# Patient Record
Sex: FEMALE | Race: WHITE | HISPANIC OR LATINO | Employment: OTHER | URBAN - METROPOLITAN AREA
[De-identification: names, ages, dates, MRNs, and addresses within clinical notes are randomized per-mention and may not be internally consistent; named-entity substitution may affect disease eponyms.]

---

## 2024-09-24 ENCOUNTER — TELEPHONE (OUTPATIENT)
Dept: OBSTETRICS AND GYNECOLOGY | Facility: CLINIC | Age: 44
End: 2024-09-24
Payer: COMMERCIAL

## 2024-09-26 ENCOUNTER — TELEPHONE (OUTPATIENT)
Dept: OBSTETRICS AND GYNECOLOGY | Facility: CLINIC | Age: 44
End: 2024-09-26

## 2024-09-26 ENCOUNTER — OFFICE VISIT (OUTPATIENT)
Dept: OBSTETRICS AND GYNECOLOGY | Facility: CLINIC | Age: 44
End: 2024-09-26
Payer: COMMERCIAL

## 2024-09-26 VITALS
HEIGHT: 64 IN | SYSTOLIC BLOOD PRESSURE: 125 MMHG | WEIGHT: 171 LBS | BODY MASS INDEX: 29.19 KG/M2 | DIASTOLIC BLOOD PRESSURE: 79 MMHG

## 2024-09-26 DIAGNOSIS — N64.3 GALACTORRHEA: ICD-10-CM

## 2024-09-26 DIAGNOSIS — Z76.89 ENCOUNTER TO ESTABLISH CARE: ICD-10-CM

## 2024-09-26 DIAGNOSIS — N64.4 MASTALGIA: Primary | ICD-10-CM

## 2024-09-26 RX ORDER — ROSUVASTATIN CALCIUM 20 MG/1
20 TABLET, COATED ORAL DAILY
Qty: 30 TABLET | Refills: 11 | Status: SHIPPED | OUTPATIENT
Start: 2024-09-26

## 2024-09-26 RX ORDER — LEVOTHYROXINE SODIUM 100 UG/1
100 TABLET ORAL DAILY
Qty: 30 TABLET | Refills: 11 | Status: SHIPPED | OUTPATIENT
Start: 2024-09-26 | End: 2025-09-26

## 2024-09-26 RX ORDER — ROSUVASTATIN CALCIUM 20 MG/1
20 TABLET, COATED ORAL DAILY
COMMUNITY

## 2024-09-26 RX ORDER — LEVOTHYROXINE SODIUM 100 UG/1
100 TABLET ORAL DAILY
COMMUNITY

## 2024-09-26 NOTE — PROGRESS NOTES
"GYN VISIT    Chief Complaint   Patient presents with    ngyn     Here today for breast pain and right breast discharge        SUBJECTIVE    Luisa is a 44 y.o.  who presents with pain and discharge from her right breast. This started years ago. She denies any issues with this breast previously. She does report cysts being followed by imaging in [place]. She denies excess caffeine usage, trauma to her right breast, previous surgeries, or smoking cigarettes.     LMP: Patient's last menstrual period was 08/10/2024.    Past Medical History:   Diagnosis Date    Disease of thyroid gland         History reviewed. No pertinent surgical history.     Review of Systems   Constitutional:  Negative for chills, diaphoresis, fatigue and fever.   Respiratory:  Negative for cough, chest tightness and shortness of breath.    Cardiovascular:  Negative for chest pain and leg swelling.   Gastrointestinal:  Negative for abdominal pain, diarrhea, nausea and vomiting.   Genitourinary:  Positive for breast discharge and breast pain. Negative for amenorrhea and breast lump.   Musculoskeletal:  Negative for neck pain and neck stiffness.   Hematological:  Negative for adenopathy.   All other systems reviewed and are negative.      OBJECTIVE     Vitals:    24 1359   BP: 125/79   Weight: 77.6 kg (171 lb)   Height: 162.6 cm (64\")        Physical Exam  Constitutional:       General: She is awake.      Appearance: Normal appearance. She is well-developed and well-groomed.   Genitourinary:   Breasts:     Breasts are symmetrical.      Breasts are soft.     Right: Nipple discharge present. No inverted nipple.      Left: No inverted nipple.   HENT:      Head: Normocephalic and atraumatic.   Pulmonary:      Effort: Pulmonary effort is normal.   Musculoskeletal:      Cervical back: Normal range of motion.   Lymphadenopathy:      Upper Body:      Right upper body: No supraclavicular or axillary adenopathy.      Left upper body: No " supraclavicular or axillary adenopathy.   Neurological:      General: No focal deficit present.      Mental Status: She is alert and oriented to person, place, and time.   Skin:     General: Skin is warm and dry.   Psychiatric:         Mood and Affect: Mood normal.         Behavior: Behavior normal. Behavior is cooperative.   Vitals reviewed.         ASSESSMENT/PLAN    Diagnoses and all orders for this visit:    1. Mastalgia (Primary)  -     Mammo Diagnostic Digital Tomosynthesis Bilateral With CAD; Future  -     POC Pregnancy, Urine  -     US Breast Right Limited; Future  -     Culture, Routine - Swab, Breast, Right  -     Prolactin  -     US Breast Left Limited; Future  -     ; Future  -       -     US Breast Left Limited  -     US Breast Right Limited  -         2. Galactorrhea  -     Mammo Diagnostic Digital Tomosynthesis Bilateral With CAD; Future  -     POC Pregnancy, Urine  -     US Breast Right Limited; Future  -     Culture, Routine - Swab, Breast, Right  -     Prolactin  -     US Breast Left Limited; Future  -     ; Future  -       -     US Breast Left Limited  -     US Breast Right Limited  -         3. Encounter to establish care  -     levothyroxine (Synthroid) 100 MCG tablet; Take 1 tablet by mouth Daily.  Dispense: 30 tablet; Refill: 11  -     rosuvastatin (Crestor) 20 MG tablet; Take 1 tablet by mouth Daily.  Dispense: 30 tablet; Refill: 11    Breast imaging ordered. Will notify patient of results and referrals made if indicated.   Requested that patient have medical records sent from [place].   Return to care for annual exam when due.    Return for for breast imaging.    I spent 30 minutes caring for Luisa on this date of service. This time includes time spent by me in the following activities: preparing for the visit, reviewing tests, performing a medically appropriate examination and/or evaluation, counseling and educating the patient/family/caregiver,  referring and communicating with other health care professionals, documenting information in the medical record, independently interpreting results and communicating that information with the patient/family/caregiver, care coordination, ordering medications, ordering test(s), ordering procedure(s), obtaining a separately obtained history, and reviewing a separately obtained history.    Va Miguel CNM  10/1/2024  12:35 EDT

## 2024-09-27 DIAGNOSIS — Z76.89 ENCOUNTER TO ESTABLISH CARE: ICD-10-CM

## 2024-09-27 LAB
CANCER AG125 SERPL-ACNC: <2 U/ML (ref 0–38.1)
PROLACTIN SERPL-MCNC: 4.7 NG/ML (ref 4.8–33.4)

## 2024-09-27 RX ORDER — LEVOTHYROXINE SODIUM 100 UG/1
100 TABLET ORAL DAILY
Qty: 30 TABLET | Refills: 11 | OUTPATIENT
Start: 2024-09-27 | End: 2025-09-27

## 2024-09-27 RX ORDER — ROSUVASTATIN CALCIUM 20 MG/1
20 TABLET, COATED ORAL DAILY
Qty: 30 TABLET | Refills: 11 | OUTPATIENT
Start: 2024-09-27

## 2024-09-30 ENCOUNTER — PATIENT MESSAGE (OUTPATIENT)
Dept: OBSTETRICS AND GYNECOLOGY | Facility: CLINIC | Age: 44
End: 2024-09-30
Payer: COMMERCIAL

## 2024-09-30 ENCOUNTER — PATIENT ROUNDING (BHMG ONLY) (OUTPATIENT)
Dept: OBSTETRICS AND GYNECOLOGY | Facility: CLINIC | Age: 44
End: 2024-09-30
Payer: COMMERCIAL

## 2024-09-30 NOTE — PROGRESS NOTES
My chart message has been sent to the patient for PATIENT ROUNDING with [facility].

## 2024-10-01 LAB
BACTERIA SPEC CULT: NORMAL
MICROORGANISM/AGENT SPEC: NORMAL

## 2024-10-02 ENCOUNTER — APPOINTMENT (OUTPATIENT)
Dept: WOMENS IMAGING | Facility: HOSPITAL | Age: 44
End: 2024-10-02
Payer: COMMERCIAL

## 2024-10-02 PROCEDURE — 77066 DX MAMMO INCL CAD BI: CPT | Performed by: RADIOLOGY

## 2024-10-02 PROCEDURE — 76642 ULTRASOUND BREAST LIMITED: CPT | Performed by: RADIOLOGY

## 2024-10-02 PROCEDURE — 77062 BREAST TOMOSYNTHESIS BI: CPT | Performed by: RADIOLOGY

## 2024-10-02 PROCEDURE — G0279 TOMOSYNTHESIS, MAMMO: HCPCS | Performed by: RADIOLOGY

## 2024-10-09 DIAGNOSIS — N64.4 MASTALGIA: ICD-10-CM

## 2024-10-09 DIAGNOSIS — N64.3 GALACTORRHEA: ICD-10-CM

## 2025-01-31 ENCOUNTER — OFFICE VISIT (OUTPATIENT)
Dept: FAMILY MEDICINE CLINIC | Facility: CLINIC | Age: 45
End: 2025-01-31
Payer: COMMERCIAL

## 2025-01-31 VITALS
SYSTOLIC BLOOD PRESSURE: 118 MMHG | HEIGHT: 64 IN | RESPIRATION RATE: 16 BRPM | OXYGEN SATURATION: 99 % | WEIGHT: 176 LBS | DIASTOLIC BLOOD PRESSURE: 80 MMHG | BODY MASS INDEX: 30.05 KG/M2 | HEART RATE: 78 BPM | TEMPERATURE: 97.4 F

## 2025-01-31 DIAGNOSIS — L74.8 APOCRINE CYST: ICD-10-CM

## 2025-01-31 DIAGNOSIS — F41.9 ANXIETY: ICD-10-CM

## 2025-01-31 DIAGNOSIS — N64.52 NIPPLE DISCHARGE: ICD-10-CM

## 2025-01-31 DIAGNOSIS — E03.9 HYPOTHYROIDISM, UNSPECIFIED TYPE: ICD-10-CM

## 2025-01-31 DIAGNOSIS — R92.8 ABNORMAL MRI, BREAST: Primary | ICD-10-CM

## 2025-01-31 DIAGNOSIS — E78.2 MIXED HYPERLIPIDEMIA: ICD-10-CM

## 2025-01-31 DIAGNOSIS — Z12.12 SCREENING FOR COLORECTAL CANCER: ICD-10-CM

## 2025-01-31 DIAGNOSIS — Z12.11 SCREENING FOR COLORECTAL CANCER: ICD-10-CM

## 2025-01-31 PROCEDURE — 99204 OFFICE O/P NEW MOD 45 MIN: CPT | Performed by: NURSE PRACTITIONER

## 2025-01-31 RX ORDER — ESCITALOPRAM OXALATE 5 MG/5ML
20 SOLUTION ORAL DAILY
COMMUNITY
End: 2025-01-31

## 2025-01-31 RX ORDER — ESCITALOPRAM OXALATE 5 MG/5ML
20 SOLUTION ORAL DAILY
Qty: 1800 ML | Refills: 3 | Status: SHIPPED | OUTPATIENT
Start: 2025-01-31

## 2025-01-31 NOTE — PROGRESS NOTES
Subjective   Luisa Asencio is a 44 y.o. female.     History of Present Illness   Luisa Asencio 44 y.o. female who presents today for a new patient appointment.    she has a history of There is no problem list on file for this patient.  .  she is here to establish care I reviewed the PFSH recorded today by my MA/LPN staff.      Patient speaks [place]enegrin so  was used.        Patient has history of hypothyroidism for which she is on levothyroxine 100 mcg daily.  She has hyperlipidemia and is taking rosuvastatin 20 mg daily.  She had both of these refilled last year by gynecology.  She is also on E citalopram 20 mg daily but is getting a liquid version.  She has not yet had this prescribed here.  This was prescribed by a physician in [place].  She does need to have this refilled today and prefers to take the liquid if she has trouble swallowing pills.    She states her last Pap was sometime in 2022.  She saw the gynecologist in September regarding a breast abnormality but Pap was not done.  She would like to have Pap scheduled here instead of gynecology.  She was having left nipple discharge and gynecology ordered diagnostic bilateral mammogram as well as ultrasound of both breasts but no abnormality was seen.  Patient then had ultrasound by physician in [place] and subsequent breast MRI which showed a cyst.  She had a biopsy done which confirmed an apocrine cyst.  She was counseled by the surgeon in [place] that she needed to have it removed as there was a possibility of it becoming cancerous.  She does have a copy of the breast MRI and cytology report from the biopsy which she printed in English in will be scanned in the chart.  Discussed with her that she will need a referral to a breast surgeon but will need to have the breast MRI repeated so that the surgeon can review the images.  Patient is okay with this.  She did have some difficulty with the breast MRI and would like to  have the medication prescribed to help her anxiety during the MRI.    She states she had a colonoscopy done in the past related to some hemorrhoids she was having issue with.  This was about 5 years ago.  She denies any family history of colon cancer.  She is agreeable to colonoscopy for colon cancer screening.      Will also need to update labs on patient as she has not had any done since last year.  She will schedule an appointment 1 day when she is fasting.          The following portions of the patient's history were reviewed and updated as appropriate: allergies, current medications, past family history, past medical history, past social history, past surgical history, and problem list.    Review of Systems   Constitutional:  Negative for unexpected weight change.   Respiratory:  Negative for shortness of breath.    Cardiovascular:  Negative for chest pain and palpitations.   Psychiatric/Behavioral:  Negative for behavioral problems.        Objective   Physical Exam  Vitals and nursing note reviewed.   Constitutional:       Appearance: Normal appearance. She is well-developed.   Neck:      Thyroid: No thyromegaly.      Vascular: No carotid bruit.   Cardiovascular:      Rate and Rhythm: Normal rate and regular rhythm.   Pulmonary:      Effort: Pulmonary effort is normal.      Breath sounds: Normal breath sounds.   Neurological:      Mental Status: She is alert and oriented to person, place, and time.   Psychiatric:         Mood and Affect: Mood normal.         Behavior: Behavior normal.         Thought Content: Thought content normal.         Judgment: Judgment normal.         Assessment & Plan   Diagnoses and all orders for this visit:    1. Abnormal MRI, breast (Primary)  -     MRI Breast Left With & Without Contrast  -     Ambulatory Referral to Breast Surgery    2. Nipple discharge  -     MRI Breast Left With & Without Contrast  -     Ambulatory Referral to Breast Surgery    3. Apocrine cyst  -      Ambulatory Referral to Breast Surgery    4. Screening for colorectal cancer  -     Ambulatory Referral For Screening Colonoscopy    5. Anxiety  -     escitalopram (LEXAPRO) 1 mg/mL solution; Take 20 mL by mouth Daily.  Dispense: 1800 mL; Refill: 3  -     Comprehensive metabolic panel  -     Lipid panel  -     CBC and Differential  -     TSH  -     T4, free  -     Hemoglobin A1c  -     T3, free  -     Vitamin D 25 hydroxy    6. Hypothyroidism, unspecified type  -     Comprehensive metabolic panel  -     Lipid panel  -     CBC and Differential  -     TSH  -     T4, free  -     Hemoglobin A1c  -     T3, free  -     Vitamin D 25 hydroxy    7. Mixed hyperlipidemia  -     Comprehensive metabolic panel  -     Lipid panel  -     CBC and Differential  -     TSH  -     T4, free  -     Hemoglobin A1c  -     T3, free  -     Vitamin D 25 hydroxy             Patient will schedule Pap with me.  She will also schedule lab appointment.  Breast MRI ordered as well as referral to breast surgeon.  Colonoscopy ordered.  Will refill the escitalopram and she will let me know when she runs out of refills on the other 2 medications unless there are any adjustments that need to be made based on labs.   will need to be used for referral scheduling.

## 2025-02-04 LAB
25(OH)D3+25(OH)D2 SERPL-MCNC: 25.9 NG/ML (ref 30–100)
ALBUMIN SERPL-MCNC: 4.3 G/DL (ref 3.5–5.2)
ALBUMIN/GLOB SERPL: 1.8 G/DL
ALP SERPL-CCNC: 93 U/L (ref 39–117)
ALT SERPL-CCNC: 23 U/L (ref 1–33)
AST SERPL-CCNC: 18 U/L (ref 1–32)
BASOPHILS # BLD AUTO: 0.04 10*3/MM3 (ref 0–0.2)
BASOPHILS NFR BLD AUTO: 0.6 % (ref 0–1.5)
BILIRUB SERPL-MCNC: <0.2 MG/DL (ref 0–1.2)
BUN SERPL-MCNC: 10 MG/DL (ref 6–20)
BUN/CREAT SERPL: 14.7 (ref 7–25)
CALCIUM SERPL-MCNC: 9 MG/DL (ref 8.6–10.5)
CHLORIDE SERPL-SCNC: 102 MMOL/L (ref 98–107)
CHOLEST SERPL-MCNC: 149 MG/DL (ref 0–200)
CO2 SERPL-SCNC: 25.9 MMOL/L (ref 22–29)
CREAT SERPL-MCNC: 0.68 MG/DL (ref 0.57–1)
EGFRCR SERPLBLD CKD-EPI 2021: 109.6 ML/MIN/1.73
EOSINOPHIL # BLD AUTO: 0.11 10*3/MM3 (ref 0–0.4)
EOSINOPHIL NFR BLD AUTO: 1.5 % (ref 0.3–6.2)
ERYTHROCYTE [DISTWIDTH] IN BLOOD BY AUTOMATED COUNT: 13 % (ref 12.3–15.4)
GLOBULIN SER CALC-MCNC: 2.4 GM/DL
GLUCOSE SERPL-MCNC: 101 MG/DL (ref 65–99)
HBA1C MFR BLD: 5.5 % (ref 4.8–5.6)
HCT VFR BLD AUTO: 42.7 % (ref 34–46.6)
HDLC SERPL-MCNC: 52 MG/DL (ref 40–60)
HGB BLD-MCNC: 14 G/DL (ref 12–15.9)
IMM GRANULOCYTES # BLD AUTO: 0.03 10*3/MM3 (ref 0–0.05)
IMM GRANULOCYTES NFR BLD AUTO: 0.4 % (ref 0–0.5)
LDLC SERPL CALC-MCNC: 83 MG/DL (ref 0–100)
LYMPHOCYTES # BLD AUTO: 2.17 10*3/MM3 (ref 0.7–3.1)
LYMPHOCYTES NFR BLD AUTO: 30.3 % (ref 19.6–45.3)
MCH RBC QN AUTO: 29.6 PG (ref 26.6–33)
MCHC RBC AUTO-ENTMCNC: 32.8 G/DL (ref 31.5–35.7)
MCV RBC AUTO: 90.3 FL (ref 79–97)
MONOCYTES # BLD AUTO: 0.45 10*3/MM3 (ref 0.1–0.9)
MONOCYTES NFR BLD AUTO: 6.3 % (ref 5–12)
NEUTROPHILS # BLD AUTO: 4.36 10*3/MM3 (ref 1.7–7)
NEUTROPHILS NFR BLD AUTO: 60.9 % (ref 42.7–76)
NRBC BLD AUTO-RTO: 0 /100 WBC (ref 0–0.2)
PLATELET # BLD AUTO: 239 10*3/MM3 (ref 140–450)
POTASSIUM SERPL-SCNC: 4.3 MMOL/L (ref 3.5–5.2)
PROT SERPL-MCNC: 6.7 G/DL (ref 6–8.5)
RBC # BLD AUTO: 4.73 10*6/MM3 (ref 3.77–5.28)
SODIUM SERPL-SCNC: 139 MMOL/L (ref 136–145)
T3FREE SERPL-MCNC: 2.5 PG/ML (ref 2–4.4)
T4 FREE SERPL-MCNC: 1.37 NG/DL (ref 0.92–1.68)
TRIGL SERPL-MCNC: 72 MG/DL (ref 0–150)
TSH SERPL DL<=0.005 MIU/L-ACNC: 2.56 UIU/ML (ref 0.27–4.2)
VLDLC SERPL CALC-MCNC: 14 MG/DL (ref 5–40)
WBC # BLD AUTO: 7.16 10*3/MM3 (ref 3.4–10.8)

## 2025-02-07 NOTE — PROGRESS NOTES
GENERAL SURGERY BENIGN BREAST HISTORY AND PHYSICAL     SUMMARY:  Luisa Asencio is a 44 y.o. lady with:  A new diagnosis of right nipple discharge.  -Imaging and cyst aspiration done in [place] in the fall.  Have recommended proceeding with an MRI now to further evaluate.  She is agreeable.  Will schedule.    Referring Provider: Lucille Gibson ([place]), *    Chief complaint: nipple discharge    HPI: Ms. Luisa Asencio is seen at the request of Lucille Gibson ([place]), *. The patient is a 44 y.o. woman being seen for a new diagnosis of right nipple discharge.      She originally was having right nipple discharge. This has been ongoing for 8 months. She noticed it the first time when her pajamas were wet when she woke up. She now only notices it with stimulation.     She had an MRI, US and biopsy in [place].     Her work-up is detailed in the breast history section below. She has had regular annual mammograms. She has had a prior biopsy in 2020, which returned as benign. She denies any breast lumps, pain, skin changes.    She denies any family history of breast or ovarian cancer.     TIMELINE OF WORKUP:  9/11/2024 Bilateral Breast MRI:   Breasts with heterogeneous fibroglandular tissue.  Focal nodular enhancement of the right retroareolar region.    10/2/2024 Bilateral Diagnostic Mammo + US:   The breasts are heterogeneously dense.   Finding 1: There are round and punctate calcifications with diffuse distribution seen in both breasts. No suspicious forms are seen at this time.  No suspicious masses, calcifications, or architectural distortion is identified.  The symptomatic region is clearly marked and there is no suspicious finding in the region of clinical concern.  There is no sonographic correlate.  Benign negative.  Finding 2: There is no suspicious finding in the region of clinical concern.  On ultrasound there is a few elongated areas of duct ectasia seen in the subareolar region of the right  breast.  No discrete or suspicious abnormality is seen.  Benign negative.  BI-RADS Category 2    11/7/2024 right breast US guided FNA     11/7/2024 Cytology results:   Cytomorphological findings of apocrine cyst of the breast, benign     MEDICAL HISTORY:   Past Medical History:   Hypothyroidism   HLD     Past Surgical History:    Colonoscopy, 2021   Thyroidectomy     Family History:    As above    Social History:   Denies tobacco use  Denies alcohol use    Allergies:   No Known Allergies    Medications:     Current Outpatient Medications:     escitalopram (LEXAPRO) 1 mg/mL solution, Take 20 mL by mouth Daily., Disp: 1800 mL, Rfl: 3    levothyroxine (Synthroid) 100 MCG tablet, Take 1 tablet by mouth Daily., Disp: 30 tablet, Rfl: 11    rosuvastatin (Crestor) 20 MG tablet, Take 1 tablet by mouth Daily., Disp: 30 tablet, Rfl: 11    Labs:    Labs from 2/3/2025 personally reviewed by me     ROS:   Influenza-like illness: no fever, no  cough, no  sore throat, no  body aches, no loss of sense of taste or smell, no known exposure to person with Covid-19.  Constitutional: Negative for fevers or chills  HENT: Negative for hearing loss or runny nose  Eyes: Negative for vision changes or scleral icterus  Respiratory: Negative for cough or shortness of breath  Cardiovascular: Negative for chest pain or heart palpitations  Gastrointestinal: Negative for abdominal pain, nausea, vomiting, constipation, melena, or hematochezia  Genitourinary: Negative for hematuria or dysuria  Musculoskeletal: Negative for joint swelling or gait instability  Neurologic: Negative for tremors or seizures  Psychiatric: Negative for suicidal ideations or depression  All other systems reviewed and negative    PHYSICAL EXAM:   Constitutional: Well-developed well-nourished, no acute distress  Eyes: Conjunctiva normal, sclera nonicteric  ENMT: Hearing grossly normal, oral mucosa moist  Neck: Supple, no palpable mass, trachea midline  Respiratory: Clear to  auscultation, normal inspiratory effort  Cardiovascular: Regular rate, no murmur, no peripheral edema, no jugular venous distention  Breast: symmetric  Right: No visible abnormalities on inspection while seated, with arms raised or hands on hips. No masses, skin changes, or nipple abnormalities. Clear/yellow right nipple discharge.   Left:  No visible abnormalities on inspection while seated, with arms raised or hands on hips. No masses, skin changes, or nipple abnormalities.  No clinical chest wall involvement.  Gastrointestinal: Soft, nontender  Lymphatics (palpable nodes): No cervical, supraclavicular or axillary lymphadenopathy  Skin:  Warm, dry, no rash on visualized skin surfaces  Musculoskeletal: Symmetric strength, normal gait  Psychiatric: Alert and oriented ×3, normal affect       TL TAVERA M.D.  General and Endoscopic Surgery  [facility]    [address] [place] [address], [address]  [place], [place], [address]  P: [phone]  F: [phone]

## 2025-02-12 ENCOUNTER — OFFICE VISIT (OUTPATIENT)
Dept: SURGERY | Facility: CLINIC | Age: 45
End: 2025-02-12
Payer: COMMERCIAL

## 2025-02-12 VITALS
BODY MASS INDEX: 30.39 KG/M2 | DIASTOLIC BLOOD PRESSURE: 75 MMHG | HEIGHT: 64 IN | SYSTOLIC BLOOD PRESSURE: 120 MMHG | HEART RATE: 73 BPM | OXYGEN SATURATION: 97 % | WEIGHT: 178 LBS

## 2025-02-12 DIAGNOSIS — N64.52 NIPPLE DISCHARGE: Primary | ICD-10-CM

## 2025-02-12 PROCEDURE — 99204 OFFICE O/P NEW MOD 45 MIN: CPT | Performed by: STUDENT IN AN ORGANIZED HEALTH CARE EDUCATION/TRAINING PROGRAM

## 2025-02-17 DIAGNOSIS — F40.240 CLAUSTROPHOBIA: Primary | ICD-10-CM

## 2025-02-17 RX ORDER — ALPRAZOLAM 0.5 MG
TABLET ORAL
Qty: 1 TABLET | Refills: 0 | Status: SHIPPED | OUTPATIENT
Start: 2025-02-17

## 2025-03-10 ENCOUNTER — OFFICE VISIT (OUTPATIENT)
Dept: FAMILY MEDICINE CLINIC | Facility: CLINIC | Age: 45
End: 2025-03-10
Payer: COMMERCIAL

## 2025-03-10 VITALS
BODY MASS INDEX: 29.67 KG/M2 | TEMPERATURE: 97.8 F | HEART RATE: 68 BPM | RESPIRATION RATE: 14 BRPM | SYSTOLIC BLOOD PRESSURE: 112 MMHG | WEIGHT: 173.8 LBS | HEIGHT: 64 IN | DIASTOLIC BLOOD PRESSURE: 70 MMHG

## 2025-03-10 DIAGNOSIS — Z12.4 ENCOUNTER FOR PAPANICOLAOU SMEAR OF CERVIX: Primary | ICD-10-CM

## 2025-03-10 PROCEDURE — 99396 PREV VISIT EST AGE 40-64: CPT

## 2025-03-10 NOTE — PROGRESS NOTES
"Chief Complaint  Gynecologic Exam (Discuss colonoscopy referral last pap ) and Breast Problem (Having mri this week for breast discharge )    Subjective          Gynecologic Exam  The patient's pertinent negatives include no pelvic pain or vaginal discharge. Pertinent negatives include no abdominal pain, back pain, chills, dysuria, fever, hematuria or rash.   Breast Problem  Pertinent negative symptoms include no abdominal pain, no chest pain, no chills, no fever, no rash and no dysuria.          Luisa Asencio 44 y.o. female  0, Para 0 female who presents for their yearly GYN exam. Periods are absent since 2024.  She reports not having additional complaints.    Current contraception:  no method at present time  History of abnormal Pap smear: no  Family history of uterine or ovarian cancer: no  Family history of colon cancer: no  History of abnormal mammogram: yes  Family history of breast cancer: no    Patient declines STD screening.     Ms. Asencio is established with Dr. Ashlie Liriano, breast surgery due to right nipple discharge. She is scheduled to have a breast MRI on Wednesday. She will follow-up with Dr. Liriano.    All information was obtained today by a Bulgarian video .      Review of Systems   Constitutional:  Negative for chills and fever.   Respiratory:  Negative for shortness of breath.    Cardiovascular:  Negative for chest pain and palpitations.   Gastrointestinal:  Negative for abdominal pain.   Genitourinary:  Negative for difficulty urinating, dysuria, hematuria, pelvic pain, vaginal bleeding, vaginal discharge and vaginal pain.   Musculoskeletal:  Negative for back pain.   Skin:  Negative for rash.        Objective   Vital Signs:   /70   Pulse 68   Temp 97.8 °F (36.6 °C) (Temporal)   Resp 14   Ht 162.6 cm (64.02\")   Wt 78.8 kg (173 lb 12.8 oz)   BMI 29.82 kg/m²      BMI is >= 25 and <30. (Overweight) The following options were offered after " discussion;: exercise counseling/recommendations and nutrition counseling/recommendations       Physical Exam  Vitals and nursing note reviewed.   Constitutional:       General: She is not in acute distress.     Appearance: She is well-developed and overweight.   HENT:      Head: Normocephalic.   Eyes:      General: No scleral icterus.     Pupils: Pupils are equal, round, and reactive to light.   Cardiovascular:      Rate and Rhythm: Normal rate.   Pulmonary:      Effort: Pulmonary effort is normal. No respiratory distress.   Genitourinary:     General: Normal vulva.      Exam position: Lithotomy position.      Pubic Area: No rash.       Labia:         Right: No rash, tenderness, lesion or injury.         Left: No rash, tenderness, lesion or injury.       Urethra: No prolapse.      Vagina: No vaginal discharge, erythema, tenderness, bleeding or lesions.      Cervix: Cervical bleeding present. No cervical motion tenderness, discharge, friability or erythema.      Uterus: Not deviated, not enlarged, not tender and no uterine prolapse.       Adnexa:         Right: No mass, tenderness or fullness.          Left: No mass, tenderness or fullness.        Rectum: Normal.      Comments: Small amount of blood at cervical os. No active bleeding. No cervical motion tenderness. No vaginal bleeding or discharge. Pap smear specimen obtained without difficulty.  Musculoskeletal:         General: No deformity.   Skin:     General: Skin is warm.      Coloration: Skin is not jaundiced.   Neurological:      General: No focal deficit present.      Mental Status: She is alert and oriented to person, place, and time.   Psychiatric:         Behavior: Behavior normal.         Thought Content: Thought content normal.         Judgment: Judgment normal.                  The following data was reviewed by: ISABEL Fuchs on 03/10/2025:  Office Visit with Nidhi Liriano MD (02/12/2025)     Results                 Assessment and Plan               Assessment & Plan  Encounter for Papanicolaou smear of cervix  Pap smear specimen obtained without difficulty.    Orders:    IGP, Aptima HPV, Rfx 16 / 18,45             Follow Up     Return in about 1 year (around 3/10/2026) for Next scheduled follow up.    Patient was given instructions and counseling regarding her condition or for health maintenance advice. Please see specific information pulled into the AVS if appropriate.

## 2025-03-12 ENCOUNTER — HOSPITAL ENCOUNTER (OUTPATIENT)
Dept: MRI IMAGING | Facility: HOSPITAL | Age: 45
Discharge: HOME | End: 2025-03-12
Admitting: NURSE PRACTITIONER
Payer: COMMERCIAL

## 2025-03-12 PROCEDURE — 25510000002 GADOBENATE DIMEGLUMINE 529 MG/ML SOLUTION: Performed by: NURSE PRACTITIONER

## 2025-03-12 PROCEDURE — 77049 MRI BREAST C-+ W/CAD BI: CPT

## 2025-03-12 PROCEDURE — A9577 INJ MULTIHANCE: HCPCS | Performed by: NURSE PRACTITIONER

## 2025-03-12 RX ADMIN — GADOBENATE DIMEGLUMINE 16 ML: 529 INJECTION, SOLUTION INTRAVENOUS at 19:58

## 2025-03-13 ENCOUNTER — TELEPHONE (OUTPATIENT)
Dept: FAMILY MEDICINE CLINIC | Facility: CLINIC | Age: 45
End: 2025-03-13
Payer: COMMERCIAL

## 2025-03-13 DIAGNOSIS — N64.52 NIPPLE DISCHARGE: ICD-10-CM

## 2025-03-13 DIAGNOSIS — R92.8 ABNORMAL MRI, BREAST: ICD-10-CM

## 2025-03-13 DIAGNOSIS — L74.8 APOCRINE CYST: ICD-10-CM

## 2025-03-13 NOTE — TELEPHONE ENCOUNTER
----- Message from Robyn CEDEÑO sent at 3/13/2025  9:59 AM EDT -----  3-13-2025PER EMAIL FROM MRI DEPT: PT needs to be called back for repeat MRI breast w/wo, per Dr. Falk.  Pt got sick after injection, nauseous and felt like she couldn't breathe.  Whole exam needs to be repeated.PLEASE PUT IN NEW ORDER FOR MRI BREAST W PEEWEE

## 2025-03-17 DIAGNOSIS — R87.610 ASCUS OF CERVIX WITH NEGATIVE HIGH RISK HPV: Primary | ICD-10-CM

## 2025-03-17 LAB
CYTOLOGIST CVX/VAG CYTO: ABNORMAL
CYTOLOGY CVX/VAG DOC CYTO: ABNORMAL
CYTOLOGY CVX/VAG DOC THIN PREP: ABNORMAL
DX ICD CODE: ABNORMAL
DX ICD CODE: ABNORMAL
HPV GENOTYPE REFLEX: ABNORMAL
HPV I/H RISK 4 DNA CVX QL PROBE+SIG AMP: NEGATIVE
OTHER STN SPEC: ABNORMAL
PATHOLOGIST CVX/VAG CYTO: ABNORMAL
RECOM F/U CVX/VAG CYTO: ABNORMAL
SERVICE CMNT-IMP: ABNORMAL
STAT OF ADQ CVX/VAG CYTO-IMP: ABNORMAL

## 2025-03-18 DIAGNOSIS — Z85.820 HISTORY OF MELANOMA: Primary | ICD-10-CM

## 2025-03-18 DIAGNOSIS — F40.240 CLAUSTROPHOBIA: ICD-10-CM

## 2025-03-18 RX ORDER — ONDANSETRON 4 MG/1
4 TABLET, ORALLY DISINTEGRATING ORAL EVERY 8 HOURS PRN
Qty: 3 TABLET | Refills: 0 | Status: SHIPPED | OUTPATIENT
Start: 2025-03-18

## 2025-03-19 RX ORDER — ALPRAZOLAM 0.5 MG
TABLET ORAL
Qty: 1 TABLET | Refills: 0 | OUTPATIENT
Start: 2025-03-19

## 2025-03-21 DIAGNOSIS — F40.240 CLAUSTROPHOBIA: ICD-10-CM

## 2025-03-21 RX ORDER — ALPRAZOLAM 1 MG/1
TABLET ORAL
Qty: 1 TABLET | Refills: 0 | Status: SHIPPED | OUTPATIENT
Start: 2025-03-21

## 2025-04-08 ENCOUNTER — OFFICE VISIT (OUTPATIENT)
Dept: OBSTETRICS AND GYNECOLOGY | Facility: CLINIC | Age: 45
End: 2025-04-08
Payer: COMMERCIAL

## 2025-04-08 VITALS
HEIGHT: 64 IN | DIASTOLIC BLOOD PRESSURE: 81 MMHG | SYSTOLIC BLOOD PRESSURE: 154 MMHG | WEIGHT: 174.4 LBS | BODY MASS INDEX: 29.78 KG/M2

## 2025-04-08 DIAGNOSIS — R87.610 ASCUS OF CERVIX WITH NEGATIVE HIGH RISK HPV: Primary | ICD-10-CM

## 2025-04-08 RX ORDER — ESTRADIOL 10 UG/1
1 TABLET, FILM COATED VAGINAL 2 TIMES WEEKLY
Qty: 24 TABLET | Refills: 3 | Status: SHIPPED | OUTPATIENT
Start: 2025-04-10

## 2025-04-08 NOTE — PROGRESS NOTES
"        REASON FOR FOLLOWUP/CHIEF COMPLAINT: (Va patent here to discuss her abnormal results regarding her pap smear. Pap 3/2025 pos for abnormal cells)      HISTORY OF PRESENT ILLNESS: Patient is seen today to discuss outside Pap with primary care provider showing atypical squamous cells with negative high-risk HPV.  We discussed the nature of the Pap screening and the value of negative high-risk HPV.  Her previous Pap before that was about 3 years ago and she has never had an abnormal Pap screening or any surgery on her cervix.  Additionally, she does report painful intercourse and some bleeding with intercourse.  She thinks that she has been menopausal now for over a year and is interested in something to help with that.  We discussed safe use of vaginal estrogen in an attempt to correct the vaginal atrophy and improve her symptoms with regard to intercourse.      Past Medical History, Past Surgical History, Social History, Family History have been reviewed and are without significant changes except as mentioned.    Review of Systems   Review of Systems   Genitourinary:  Positive for dyspareunia and vaginal bleeding. Negative for pelvic pain.        Postcoital bleeding       Medications:  The current medication list was reviewed in the EMR    ALLERGIES:    Allergies   Allergen Reactions    Multihance [Gadobenate] Nausea Only     Pt sts she felt like she couldn't breathe; but after a minute she was back to normal               Vitals:    04/08/25 1320   BP: 154/81   Weight: 79.1 kg (174 lb 6.4 oz)   Height: 162.6 cm (64.02\")     Physical Exam    CONSTITUTIONAL:  Vital signs reviewed.  No distress, looks comfortable.    PSYCHIATRIC:  Normal judgment and insight.  Normal mood and affect.       RECENT LABS:  WBC   Date Value Ref Range Status   02/03/2025 7.16 3.40 - 10.80 10*3/mm3 Final     Hemoglobin   Date Value Ref Range Status   02/03/2025 14.0 12.0 - 15.9 g/dL Final     Platelets   Date Value Ref Range " Status   02/03/2025 239 140  450 10*3/mm3 Final       ASSESSMENT/PLAN:  Luisa Asencio [unfilled]   1.  Pap smear with ASCUS, negative high-risk HPV.  Discussed current recommendations and will repeat Pap screen in 1 year.  Most of these will resolve spontaneously.  2.  Dyspareunia related to vaginal atrophy.  Will initiate vaginal estrogen and I recommend that she give it 3 to 4 months to see improvement.  Vagifem prescription given.

## 2025-04-30 ENCOUNTER — HOSPITAL ENCOUNTER (OUTPATIENT)
Dept: MRI IMAGING | Facility: HOSPITAL | Age: 45
Discharge: HOME | End: 2025-04-30
Admitting: NURSE PRACTITIONER
Payer: COMMERCIAL

## 2025-04-30 PROCEDURE — A9577 INJ MULTIHANCE: HCPCS | Performed by: NURSE PRACTITIONER

## 2025-04-30 PROCEDURE — 25510000002 GADOBENATE DIMEGLUMINE 529 MG/ML SOLUTION: Performed by: NURSE PRACTITIONER

## 2025-04-30 PROCEDURE — 77049 MRI BREAST C-+ W/CAD BI: CPT

## 2025-04-30 RX ADMIN — GADOBENATE DIMEGLUMINE 20 ML: 529 INJECTION, SOLUTION INTRAVENOUS at 19:55

## 2025-05-01 ENCOUNTER — PATIENT MESSAGE (OUTPATIENT)
Dept: FAMILY MEDICINE CLINIC | Facility: CLINIC | Age: 45
End: 2025-05-01

## 2025-06-24 ENCOUNTER — OFFICE VISIT (OUTPATIENT)
Dept: FAMILY MEDICINE CLINIC | Facility: CLINIC | Age: 45
End: 2025-06-24
Payer: COMMERCIAL

## 2025-06-24 VITALS
TEMPERATURE: 98.4 F | DIASTOLIC BLOOD PRESSURE: 80 MMHG | BODY MASS INDEX: 30.19 KG/M2 | OXYGEN SATURATION: 96 % | HEIGHT: 64 IN | SYSTOLIC BLOOD PRESSURE: 130 MMHG | HEART RATE: 86 BPM | WEIGHT: 176.8 LBS

## 2025-06-24 DIAGNOSIS — M25.562 CHRONIC PAIN OF LEFT KNEE: Primary | ICD-10-CM

## 2025-06-24 DIAGNOSIS — M25.571 ACUTE RIGHT ANKLE PAIN: ICD-10-CM

## 2025-06-24 DIAGNOSIS — Z71.3 WEIGHT LOSS COUNSELING, ENCOUNTER FOR: ICD-10-CM

## 2025-06-24 DIAGNOSIS — G89.29 CHRONIC PAIN OF LEFT KNEE: Primary | ICD-10-CM

## 2025-06-24 PROCEDURE — 99214 OFFICE O/P EST MOD 30 MIN: CPT | Performed by: NURSE PRACTITIONER

## 2025-06-24 NOTE — PROGRESS NOTES
Subjective   Luisa Asencio is a 45 y.o. female.    service used.   History of Present Illness   Chief Complaint     Weight Loss (-perimenopause  -Increase in hunger and slow metabolism.)- states insurance will not cover wegovy or zepbound, only mounjaro and ozempic.  She does not have diabetes.    Knee Pain (Left  -Limits exercise )- started a year or more ago.  No known injury.  Located on medial knee and keeps her from exercising on the treadmill.    Right ankle/foot pain after fall 2 weeks ago.  Reports she rolled her ankle.  Still having significant pain with walking.  She has not been using ankle sleeve or wrap.    The following portions of the patient's history were reviewed and updated as appropriate: allergies, current medications, past family history, past medical history, past social history, past surgical history, and problem list.    Review of Systems   Constitutional:  Negative for unexpected weight change.   Respiratory:  Negative for shortness of breath.    Cardiovascular:  Negative for chest pain and palpitations.   Musculoskeletal:  Positive for arthralgias.   Psychiatric/Behavioral:  Negative for behavioral problems.        Objective   Physical Exam  Vitals and nursing note reviewed.   Constitutional:       Appearance: Normal appearance. She is well-developed.   Cardiovascular:      Rate and Rhythm: Normal rate.   Pulmonary:      Effort: Pulmonary effort is normal.   Musculoskeletal:      Left knee: Tenderness present over the MCL.      Right ankle: No swelling. Tenderness present over the ATF ligament. Decreased range of motion.        Legs:    Neurological:      Mental Status: She is alert and oriented to person, place, and time.   Psychiatric:         Mood and Affect: Mood normal.         Behavior: Behavior normal.         Thought Content: Thought content normal.         Judgment: Judgment normal.         Assessment & Plan   Diagnoses and all orders for this visit:    1.  Chronic pain of left knee (Primary)  -     XR Knee 1 or 2 View Left (In Office)  -     Ambulatory Referral to Orthopedic Surgery    2. Acute right ankle pain  -     XR Ankle 3+ View Right (In Office)    3. BMI 30.0-30.9,adult  -     Semaglutide-Weight Management 0.5 MG/0.5ML solution auto-injector; Inject 0.5 mL under the skin into the appropriate area as directed 1 (One) Time Per Week.  Dispense: 2 mL; Refill: 1        2 view xray of left knee ordered and reviewed by me and 3 view xray of right ankle ordered and reviewed by me. no fracture noted.   No comparison on file.        Gave her 4 week sample of wegovy 0.25 mg and sent rx to [facility] for the 0.5 mg dose. She was given copay card to bring the cost for the first month down to $199.  She understands the cost will go up to $499 after that.

## 2025-06-26 ENCOUNTER — PATIENT MESSAGE (OUTPATIENT)
Dept: ORTHOPEDICS | Facility: OTHER | Age: 45
End: 2025-06-26
Payer: COMMERCIAL

## 2025-06-30 ENCOUNTER — TELEPHONE (OUTPATIENT)
Dept: SPORTS MEDICINE | Facility: CLINIC | Age: 45
End: 2025-06-30

## 2025-06-30 ENCOUNTER — PRIOR AUTHORIZATION (OUTPATIENT)
Dept: FAMILY MEDICINE CLINIC | Facility: CLINIC | Age: 45
End: 2025-06-30
Payer: COMMERCIAL

## 2025-06-30 NOTE — TELEPHONE ENCOUNTER
Caller: RANDY ROBERTS  Relationship to patient: SELF  Best call back number: [phone]    Service: SUSI  Is  needs updated in registration: YES  Date of Appointment: 7-11-25  Time of Appointment: 1:20

## 2025-07-10 DIAGNOSIS — Z71.3 WEIGHT LOSS COUNSELING, ENCOUNTER FOR: ICD-10-CM

## 2025-07-10 DIAGNOSIS — E78.2 MIXED HYPERLIPIDEMIA: ICD-10-CM

## 2025-07-10 DIAGNOSIS — E03.9 HYPOTHYROIDISM, UNSPECIFIED TYPE: ICD-10-CM

## 2025-07-10 NOTE — TELEPHONE ENCOUNTER
I have put in for mounjaro but it is very likely that insurance will deny this as the only FDA indication for this particular medication is type 2 diabetes.  I have attached the diagnoses as requested.  We will not do an appeal on this if it gets denied.

## 2025-07-16 ENCOUNTER — PRIOR AUTHORIZATION (OUTPATIENT)
Dept: FAMILY MEDICINE CLINIC | Facility: CLINIC | Age: 45
End: 2025-07-16
Payer: COMMERCIAL

## 2025-07-16 ENCOUNTER — PATIENT MESSAGE (OUTPATIENT)
Dept: FAMILY MEDICINE CLINIC | Facility: CLINIC | Age: 45
End: 2025-07-16
Payer: COMMERCIAL

## 2025-07-16 NOTE — TELEPHONE ENCOUNTER
Luisa Asencio (Key: [id number])  Rx #: [id number]  Mounjaro 2.5MG/0.5ML auto-injectors  Form  [employer] Electronic Prior Authorization Form (2017 NCP[employer])  Created  6 days ago  Sent to Plan  3 minutes ago  Plan Response  3 minutes ago  Submit Clinical Questions  less than a minute ago  Determination  Wait for Determination  Please wait for [employer] 2017 NCPDP to return a determination.

## 2025-07-16 NOTE — TELEPHONE ENCOUNTER
Luisa Asencio (Key: [id number])  Rx #: [id number]  Mounjaro 2.5MG/0.5ML auto-injectors  Form  [employer] Electronic Prior Authorization Form (2017 NCPDP)  Created  6 days ago  Sent to Plan  11 minutes ago  Plan Response  11 minutes ago  Submit Clinical Questions  8 minutes ago  Determination  Unfavorable  3 minutes ago  Message from Plan  Request Reference Number: [id number]. MOUNJARO INJ 2.5/0.5 is denied for not meeting the prior authorization requirement(s). Details of this decision are in the notice attached below or have been faxed to you.

## 2025-08-11 DIAGNOSIS — Z76.89 ENCOUNTER TO ESTABLISH CARE: ICD-10-CM

## 2025-08-11 RX ORDER — LEVOTHYROXINE SODIUM 100 UG/1
100 TABLET ORAL DAILY
Qty: 30 TABLET | Refills: 5 | Status: SHIPPED | OUTPATIENT
Start: 2025-08-11 | End: 2026-08-11

## 2025-08-11 RX ORDER — ROSUVASTATIN CALCIUM 20 MG/1
20 TABLET, COATED ORAL DAILY
Qty: 30 TABLET | Refills: 5 | Status: SHIPPED | OUTPATIENT
Start: 2025-08-11

## 2025-08-29 RX ORDER — TRAZODONE HYDROCHLORIDE 50 MG/1
50 TABLET ORAL NIGHTLY
Qty: 90 TABLET | Refills: 1 | Status: SHIPPED | OUTPATIENT
Start: 2025-08-29